# Patient Record
Sex: FEMALE | Race: WHITE
[De-identification: names, ages, dates, MRNs, and addresses within clinical notes are randomized per-mention and may not be internally consistent; named-entity substitution may affect disease eponyms.]

---

## 2017-03-07 ENCOUNTER — HOSPITAL ENCOUNTER (OUTPATIENT)
Dept: HOSPITAL 84 - D.MAMMO | Age: 57
Discharge: HOME | End: 2017-03-07
Attending: FAMILY MEDICINE
Payer: COMMERCIAL

## 2017-03-07 DIAGNOSIS — Z12.31: Primary | ICD-10-CM

## 2018-05-09 ENCOUNTER — HOSPITAL ENCOUNTER (OUTPATIENT)
Dept: HOSPITAL 84 - D.MAMMO | Age: 58
Discharge: HOME | End: 2018-05-09
Attending: FAMILY MEDICINE
Payer: COMMERCIAL

## 2018-05-09 DIAGNOSIS — Z12.31: Primary | ICD-10-CM

## 2019-08-22 ENCOUNTER — HOSPITAL ENCOUNTER (OUTPATIENT)
Dept: HOSPITAL 84 - D.MAMMO | Age: 59
Discharge: HOME | End: 2019-08-22
Attending: FAMILY MEDICINE
Payer: COMMERCIAL

## 2019-08-22 DIAGNOSIS — Z12.31: Primary | ICD-10-CM

## 2019-12-19 ENCOUNTER — HOSPITAL ENCOUNTER (OUTPATIENT)
Dept: HOSPITAL 84 - D.HCCARDIO | Age: 59
Discharge: HOME | End: 2019-12-19
Attending: INTERNAL MEDICINE
Payer: COMMERCIAL

## 2019-12-19 DIAGNOSIS — R94.31: Primary | ICD-10-CM

## 2019-12-19 DIAGNOSIS — I10: ICD-10-CM

## 2020-01-15 ENCOUNTER — HOSPITAL ENCOUNTER (OUTPATIENT)
Dept: HOSPITAL 84 - D.CATH | Age: 60
Discharge: HOME | End: 2020-01-15
Attending: INTERNAL MEDICINE
Payer: COMMERCIAL

## 2020-01-15 VITALS — BODY MASS INDEX: 44.77 KG/M2 | WEIGHT: 288.6 LBS | HEIGHT: 67.5 IN | BODY MASS INDEX: 44.77 KG/M2

## 2020-01-15 VITALS — SYSTOLIC BLOOD PRESSURE: 147 MMHG | DIASTOLIC BLOOD PRESSURE: 69 MMHG

## 2020-01-15 DIAGNOSIS — R06.00: ICD-10-CM

## 2020-01-15 DIAGNOSIS — R94.31: ICD-10-CM

## 2020-01-15 DIAGNOSIS — I20.9: Primary | ICD-10-CM

## 2020-01-15 LAB
ANION GAP SERPL CALC-SCNC: 11.3 MMOL/L (ref 8–16)
BASOPHILS NFR BLD AUTO: 0.3 % (ref 0–2)
BUN SERPL-MCNC: 14 MG/DL (ref 7–18)
CALCIUM SERPL-MCNC: 8.6 MG/DL (ref 8.5–10.1)
CHLORIDE SERPL-SCNC: 104 MMOL/L (ref 98–107)
CO2 SERPL-SCNC: 29.5 MMOL/L (ref 21–32)
CREAT SERPL-MCNC: 1 MG/DL (ref 0.6–1.3)
EOSINOPHIL NFR BLD: 4.3 % (ref 0–7)
ERYTHROCYTE [DISTWIDTH] IN BLOOD BY AUTOMATED COUNT: 15.1 % (ref 11.5–14.5)
GLUCOSE SERPL-MCNC: 162 MG/DL (ref 74–106)
HCT VFR BLD CALC: 35.7 % (ref 36–48)
HGB BLD-MCNC: 12.1 G/DL (ref 12–16)
IMM GRANULOCYTES NFR BLD: 0.3 % (ref 0–5)
LYMPHOCYTES NFR BLD AUTO: 20.9 % (ref 15–50)
MCH RBC QN AUTO: 33.2 PG (ref 26–34)
MCHC RBC AUTO-ENTMCNC: 33.9 G/DL (ref 31–37)
MCV RBC: 97.8 FL (ref 80–100)
MONOCYTES NFR BLD: 10 % (ref 2–11)
NEUTROPHILS NFR BLD AUTO: 64.2 % (ref 40–80)
OSMOLALITY SERPL CALC.SUM OF ELEC: 285 MOSM/KG (ref 275–300)
PLATELET # BLD: 236 10X3/UL (ref 130–400)
PMV BLD AUTO: 9.4 FL (ref 7.4–10.4)
POTASSIUM SERPL-SCNC: 3.8 MMOL/L (ref 3.5–5.1)
RBC # BLD AUTO: 3.65 10X6/UL (ref 4–5.4)
SODIUM SERPL-SCNC: 141 MMOL/L (ref 136–145)
WBC # BLD AUTO: 6.7 10X3/UL (ref 4.8–10.8)

## 2020-01-15 NOTE — NUR
PT RECEIVED VIA STRETCHER FROM CATH LAB FOR RECOVERY.  PT AWAKE BUT
DROWSY. PT DENIES PAIN OR DISCOMFORT.  IV PATENT INFUSING VIA L HAND
PER ORDERS.  PT PLACED ON CARDIAC MONITORS AND O2 VIA NC AT 2L.  HR
NSR RATE 64, /47, RR 11, SAT 97.  R WRIST W TR BAND AND
IMMOBILIZER, NO BLEEDING OR S/S HEMATOMA NOTED.  ARM PINK AND WARM,
CAP REFILL BRISK.  PT INSTRUCTED NOT TO USE ARM SHE VERBALIZED
UNDERSTANDING.  DR AGUILAR WAS IN ROOM BEFORE PT ARRIVAL AND SPOKE WITH
FAMILY REGARDING PROCEDURE RESULTS AND PLAN OF CARE.  CALL LIGHT IN
REACH.

## 2020-01-15 NOTE — NUR
PT DISCHARGED VIA  TO [facility] WAITING IN PRIVATE VEHICLE.  PT HAD
ALL BELONGINGS AND DISCHARGE PAPERWORK.

## 2020-01-15 NOTE — NUR
PT CONTINUES RESTING, NO CHANGE IN CONDITION.  NO BLEEDING OR S/S
HEMATOMA TO R WRIST, DRESSING CDI.  VSS.  CAP REFILL BRISK.  CALL
LIGHT IN REACH.  SANDWICH OFFERED, PT REFUSED AT THIS TIME.

## 2020-01-15 NOTE — NUR
PT RESTING COMFORTABLY W EYES CLOSED.  PT GIVEN SIPS OF DT SPRITE PER
REQUEST.  TR BAND AND IMMOBILIZER IN PLACE, NO BLEEDING OR S/S
HEMATOMA NOTED.  CAP REFILL BRISK.  VSS.  FAMILY AT BEDSIDE, CALL
LIGHT IN REACH

## 2020-01-15 NOTE — NUR
PT RESTING W/O COMPLAINTS.  5CC AIR REMOVED FROM TR BAND, NO BLEEDING
OR S/S HEMATOMA NOTED. HR 63, /61, RR 16.  O2 REMOVED, SAT 94
ON ROOM AIR.  CALL LIGHT IN REACH, NEPHEW REMAINS AT BEDSIDE.

## 2020-01-15 NOTE — NUR
3 ADD'L CC AIR REMOVED FROM TR BAND, NO BLEEDING OR S/S HEMATOMA
NOTED.  VSS.  PT DENIES PAIN OR NEEDS AT THIS TIME.  CALL LIGHT
REMAINS IN REACH

## 2020-01-15 NOTE — NUR
DISCHARGE INSTRUCTIONS REVIEWED W PT AND NEPHEW, BOTH VERBALIZED
UNDERSTANDING.  IV REMOVED W CATH INTACT, MONITORS REMOVED AND PT UP
TO DRESS FOR DISCHARGE.
1540  REMAINING AIR AND TR BAND REMOVED W/O BLEEDING OR S/S HEMATOMA
NOTED.  2X2 AND TEGADERM DRESSING APPLIED.  IMMOBILIZER REAPPLIED.

## 2020-01-15 NOTE — HEMODYNAMI
PATIENT:JOSÉ MIGUEL MIMS                            MEDICAL RECORD: [id number]
: [birth year]                                            LOCATION:D.CAT          
ACCT# [id number]                                       ADMISSION DATE: 01/15/20
 
 
 Generatedon:1/15/502602:50
Patient name: JOSÉ MIGUEL MIMS Patient #: [id number] Visit #: [id number] SSN: [id number]
[id number] :
1960 Date of study: 1/15/2020
Page: Of
Hemodynamic Procedure Report
****************************
Patient Data
Patient Demographics
Procedure consent was obtained
First Name: JOSÉ MIGUEL          Gender: Female
Last Name: PRASANNA          : 1960
Middle Initial: ADDISON      Age: 59 year(s)
Patient #: [id number]       Race: 
Visit #: [id number]
SSN: [id number]
Accession #:
[id number]
Additional ID: [id number]
Contact details
Address: [place] [address]        Phone: [phone]
State: [place]
City: [place]
Zip code: [id number]
Past Medical History
Performed procedures and imaging results
Date       Procedure      Procedure Results      Comments
2019 Stress testing Positive->Intermediate
with SPECT MPI risk
Allergies: No known allergies
Admission
Admission Data
Admission Date: 1/15/2020   Admission Time: 10:59
Arrival Date: 1/15/2020     Arrival Time: 0:00
Admit Source: Other         Insurance Payor: Private
health insurance
[facility] #: [id number]
Height (in.): 67.5          BSA: 2.38 (m2)
Height (cm.): 171.45        BMI: 44.53 (kg/m2)
Weight (lbs.): 288.59
Weight (kg.): 130.9
Lab Results
Lab Result Date: 1/15/2020  Lab Result Time: 0:00
Biochemistry
Name         Units    Result                Min      Max
BUN          mg/dl    14       --(--*-)--   7        18
Creatinine   mg/dl    1        --(--*-)--   0.6      1.3
eGFR         ml/min   60       *-(----)--   90       120
NONAFRICAN
CBC
Name         Units    Result                Min      Max
Hematocrit   %        35.7     *-(----)--   42       54
Hemoglobin   g/dl     12.1     *-(----)--   13.5     17.5
Procedure
 
Procedure Types
Cath Procedure
Diagnostic Procedure
Formerly [facility] w/Coronaries
Procedure Description
Procedure Date
Procedure Date: 1/15/2020
Procedure Start Time: 13:32
Procedure End Time: 13:49
Procedure Staff
Name                            Function
Anthony Coughlin MD                   Performing Physician
Nicole George RN                Nurse
Hoda Hamilton RT              Scrub
Jelly Sexton RT                Monitor
Indication
Abnormal nuclear perfusion test
Procedure Data
Cath Procedure
Fluoroscopy
Diagnostic fluoroscopy      Total fluoroscopy Time: 1.9
time: 1.9 min               min
Diagnostic fluoroscopy      Total fluoroscopy dose: 672
dose: 672 mGy               mGy
Contrast Material
Contrast Material Type                       Amount (ml)
Isovue 370                                   61
Entry Location
Entry     Primary  Successful  Side  Size  Upsize Upsize Entry    Closure Succes
sful  Closure
Location                             (Fr)  1 (Fr) 2 (Fr) Remarks  Device        
      Remarks
Radial                         Right 6 Fr                         Exoseal
artery                               Short
Estimated blood loss: 5 ml
Diagnostic catheters
Device Type               Used For           End Catheter
Placement
DIAGNOSTIC Hendrum 110cm 5  Procedure
Fr catheter ([id number])
Procedure Complications
No complications
Procedure Medications
Medication           Administration Route Dosage
0.9% NaCl            I.V.                 100 ml/hr
Oxygen               etCO2 Nasal cannula  2 l/min
Lidocaine 2%         added to field       20
Heparin Flush Bag    added to field       2 bags
(1000units/500ml NS)
Radial Cocktail      added to field       1 syringe
(Verapamil 2mg/Nitro
400mcg/Heparin
1500units)
Versed               I.V.                 2 mg
Fentanyl             I.V.                 50 mcg
Fentanyl             I.V.                 50 mcg
Hemodynamics
Rest
BSA: 2.38 (m2) HGB: 12.1 (g/dl) O2 Consumption: Estimated: 220.24 (ml/min) O2 Co
 
nsumption
indexed: Estimated:92.54 (ml/min/m) Heart Rate: 64 (bpm)
Pressure Samples
Time     Site     Value (mmHg) Purpose      Heart      Use
Rate(bpm)
13:35    LV       152/9,36     Snapshot     74
Gradients
Valve  Time  Site Site Mean    SEP/DFP    Peak To Heart  Use
1    2    (mmHg)  (sec/min)  Peak    Rate
(mmHg)  (bpm)
Aortic 13:36 LV   AO                              76
Snapshots
Pre Cath      Intra         NCS           Post Cath
Vital Signs
Time     Heart  Resp   SPO2 etCO2   NIBP (mmHg) Rhythm  Pain    Sedation
Rate   (ipm)  (%)  (mmHg)                      Status  Level
(bpm)
13:08:58 75     15     97   44.2    154/80(102) NSR     0 (11)  10(A)
, No
pain
13:13:30 66     17     97   45.7    142/76(90)  NSR     0 (11)  10(A)
, No
pain
13:17:58 63     18     98   20.9    131/70(90)  NSR     0 (11)  10(A)
, No
pain
13:22:25 64     12     96   52.5    127/71(86)  NSR     0 (11)  10(A)
, No
pain
13:26:49 64     13     98   52.5    127/74(100) NSR     0 (11)  10(A)
, No
pain
13:31:13 67     14     97   49.5    135/75(98)  NSR     0 (11)  10(A)
, No
pain
13:35:39 65     16     98   46.5    136/76(94)  NSR     0 (11)  10(A)
, No
pain
13:40:10 70     16     95   47.2    137/69(104) NSR     0 (11)  10(A)
, No
pain
13:44:40 67     17     94   45.7    137/70(101) NSR     0 (11)  10(A)
, No
pain
13:49:11 65     15     96   45      133/70(94)  NSR     0 (11)  10(A)
, No
pain
Medications
Time     Medication       Route   Dose    Verified Delivered Reason     Notes  E
ffectiveness
by       by
13:07:45 0.9% NaCl        I.V.    100     Anthony     Nicole     used for
ml/hr   Ced George   procedure
RN
13:07:51 Oxygen           etCO2   2 l/min Anthony     Nicole     used for
Nasal           Ced George   procedure
cannula                  RN
13:07:56 Lidocaine 2%     added   20ml    Anthony     Anthony      for local
to      vial    Ced Coughlin MD  anesthetic
field
 
13:08:01 Heparin Flush    added   2 bags  Anthony     Anthony      used for
Bag              to              Ced Coughlin MD  procedure
(1000units/500ml field
NS)
13:08:06 Radial Cocktail  added   1       Anthony     Anthony      used for
(Verapamil       to      syringe Ced Coughlin MD  procedure
2mg/Nitro        field
400mcg/Heparin
1500units)
13:31:30 Versed           I.V.    2 mg    Anthony     Nicole     for
Ced George   sedation
RN
13:31:40 Fentanyl         I.V.    50 mcg  Anthony     Nicole     for
Ced George   sedation
RN
13:37:48 Fentanyl         I.V.    50 mcg  Anthony     Nicole     for
Ced George   sedation
RN
Procedure Log
Time     Note
12:45:16 Informed consent obtained and on chart
12:45:23 Arrival Date: 1/15/2020 12:00:00 AM
12:45:24 Admit Source: Other
12:45:40 Insurance Payor : Private health insurance
12:47:12 Patient Weight : 288.59 lbs
12:47:34 Patient Height : 67.5 inches
12:53:34 Indication : Abnormal nuclear perfusion test
12:53:39 Diagnostic Cath Status : Elective
12:54:02 Procedure Status Elective Heart Cath (OP).
12:54:11 Hoda Hamilton RT(R) (CV) sent for patient. Start
room use.
12:54:13 Time tracking: Regular hours (M-F 7:00 - 5:00)
12:54:18 Plan of Care:Hemodynamics will remain stable., Cardiac
rhythm will remain stable., Comfort level will be
maintained., Respiratory function will remain
adequate., Patient/ family verbilizes understanding of
procedure., Procedure tolerated without complication.,
Recovers from procedure without complications..
12:54:51 Stress Test: yes; abnormal ANDTERIOR ADN LATERAL
SEGMENTS
12:55:04 Lab results completed and on chart.
12:56:54 Risk of Mortality: 0.1
12:56:57 Risk of blood transfusion: 0.2
12:57:01 Risk of ROMERO: .3
12:57:03 Alarms reviewed by R. N.
12:57:04 Sharps counted by scrub and verified by R.N.
12:57:15 Patient received from Pre/Post Procedure Room to CCL 1
Alert and oriented. Tansferred to table in Supine
position.
12:57:17 Warm blankets applied, and karen hugger turned on for
patient comfort.
12:57:18 Correct patient and procedure confirmed by team.
12:57:20 ECG and BP/O2 sat monitors applied to patient.
12:57:28 Signed procedure consent form obtained from patient.
12:58:03 Lab Result : Creatinine 1 mg/dl
12:58:03 Lab Result : BUN 14 mg/dl
12:58:03 Lab Result : Hemoglobin 12.1 g/dl
12:58:03 Lab Result : eGFR NONAFRICAN 60 ml/min
12:58:03 Lab Result : Hematocrit 35.7 %
12:58:21 H&P Date Dictated: 2020 Within 30 days and on
 
chart..
12:58:23 Pre-procedure instructions explained to patient.
12:58:24 Pre-op teaching completed and patient verbalized
understanding.
12:58:26 Family in waiting room.
12:58:28 Patient NPO since Midnight.
12:58:36 Patient allergic to No known allergies
13:07:32 Vital chart was started
13:07:45 0.9% NaCl 100 ml/hr I.V. was administered by Nicole George RN; used for procedure; Verbal order read back
and verified.
13:07:51 Oxygen 2 l/min etCO2 Nasal cannula was administered by
Nicole George RN; used for procedure; Verbal order
read back and verified.
13:07:56 Lidocaine 2% 20ml vial added to field was administered
by Anthony Coughlin MD; for local anesthetic; Verbal order
read back and verified.
13:08:01 Heparin Flush Bag (1000units/500ml NS) 2 bags added to
field was administered by Anthony Coughlin MD; used for
procedure; Verbal order read back and verified.
13:08:06 Radial Cocktail (Verapamil 2mg/Nitro 400mcg/Heparin
1500units) 1 syringe added to field was administered
by Anthony Coughlin MD; used for procedure; Verbal order
read back and verified.
13:16:46 Is the patient allergic to Iodine/contrast media? No.
13:16:48 Was the patient premedicated? Yes
13:16:52 Is patient on blood thinner?No
13:16:58 Patient diabetic? Yes.
13:16:59 If diabetic: On Metformin? Yes
13:17:07 If on Metformin: Last Dose? 2020
13:17:10 Patient not pregnant. Patient is over age 55.
13:17:11 ----Pre-sedation anethsthesia assessment.----
13:17:14 Previous problem with sedation/anesthesia? No ?
13:17:16 Snore? Yes
13:17:17 Sleep apnea? Yes
13:17:19 Deviated septum? No
13:17:20 Opens mouth fully? Yes
13:17:21 Sticks out tongue? Yes
13:17:23 Airway obstruction? No ?
13:17:26 Dentures? No ?
13:17:32 Pre procedure: right dorsailis pedis pulse 2+ Normal;
easily identifiable; not easily obliterated
13:17:35 Modified Lewis's test Ulnar < 7 seconds
13:17:39 Patient pain scale 0/10 ?.
13:17:53 IV patent on arrival in left antecubital with 0.9%
NaCl at [facility].
13:18:00 Right Radial & Right Groin area was prepped with
chlora-prep and draped in sterile fashion
13:18:18 Rhythm: sinus rhythm
13:18:21 Baseline sample Acquired.
13:18:22 Full Disclosure recording started
13:18:25 Use device set Radial Dx or PCI
13:18:26 ACIST Syringe (90018) opened to sterile field.
13:18:27 Medline Cath Pack ([id number]) opened to sterile field.
13:18:27 Bag Decanter (2002S) opened to sterile field.
13:18:28 ACIST Hand Control (46491) opened to sterile field.
13:18:29 ACIST Manifold (33076) opened to sterile field.
13:18:30 MBraNova Lignum Wrist Support ([id number]) opened to sterile
field.
13:18:31 NEEDLE Cook 21G 4cm Radial (R32798) opened to sterile
 
field.
13:18:32 EMERALD Guide Wire (257-104) opened to sterile field.
13:18:33 SHEATH 6FR RAIN ([id number]) opened to sterile field.
13:30:58 --------ALL STOP TIME OUT------
13:30:59 Final Timeout: patient, procedure, and site verified
with staff and physician. All members of the team are
in agreement.
13:31:01 Right Radial & Right Groin site verified by team.
13:31:05 Fire Safety Assessment: A--An alcohol-based skin
anteseptic being used preoperatively., C--Open oxygen
or nitrous oxide is being used., D--An ESU, laser, or
fiber-optic light is being used.
13:31:11 Physical assessment completed. ASA score P 2 - A
patient with mild systemic disease as per Anthony Coughlin MD.
13:31:15 2) 60-89 Mildly reduced kidney function, and other
findings (as for stage 1) point to kidney disease.
13:31:19 Maximum allowable contrast dose (3.7 X eGFR X 0.75)167
ml.
13:31:24 Sedation plan: IV Moderate Sedation Medication:Versed,
Fentanyl
13:31:27 Procedure started.
13:31:30 Versed 2 mg I.V. was administered by Nicole George RN;
for sedation; Verbal order read back and verified.
13:31:40 Fentanyl 50 mcg I.V. was administered by Nicole George
RN; for sedation; Verbal order read back and verified.
13:32:03 Local anesthetic to right radial artery with Lidocaine
2% by Anthony Coughlin MD.**INITIAL ACCESS ONLY**
13:33:32 A 6 Fr Short sheath was inserted into the Right Radial
artery
13:34:25 A DIAGNOSTIC Tiger 110cm 5 Fr catheter ([id number]) was
advanced over the wire and used for Procedure.
13:34:35 Injector settings: Ml/sec: 5, Volume: 15,
13:35:28 LV gram done using RICARDO
13:35:30 LV hemodynamics recorded.
13:35:41 EF : 55 %
13:36:27 LCA angiography performed.
13:37:48 Fentanyl 50 mcg I.V. was administered by Nicole George
RN; for sedation; Verbal order read back and verified.
13:38:24 RCA angiography performed.
13:38:50 Catheter removed.
13:39:33 TR BAND Large ([id number]) opened to sterile field.
13:39:44 Sheath removed intact; hemostasis achieved with
Exoseal to the Right Radial artery.
13:39:47 Procedure ended.(Physican Out)
13:39:59 Fluoroscopy time 01.90 minutes.
13:40:03 Fluoroscopy dose: 672 mGy
13:40:03 Flurop Dose total: 672
13:40:09 Dose Area Product 91338 mGy/cm.
13:40:15 Contrast amount:Isovue 370 61ml.
13:40:20 Maximum allowable dose exceeded? No.
13:40:21 Sharps counted by scrub and verified by R.N.
13:48:05 Franklin band inflated with 10cc of air.
13:48:09 Post Procedure Pulses reassessed and unchanged
13:48:12 Post procedure: right dorsailis pedis pulse 2+ Normal;
easily identifiable; not easily obliterated.
13:48:15 Post-procedure physical assessment completed. ASA
score P 2 - A patient with mild systemic disease as
per Anthony Coughlin MD.
13:48:18 Post procedure rhythm: unchanged.
 
13:48:21 Estimated blood loss: 5 ml
13:48:22 Post procedure instruction explained to
patient.Patient verbalizes understanding.
13:48:23 Patient needs reinforcement of post procedure
teaching.
13:49:10 Procedure and supply charges have been captured,
reviewed, submitted and are correct.
13:49:14 Procedure Complication : No complications
13:49:18 Vital chart was stopped
13:49:20 [facility] Findings: mild to moderate CAD (<70%)
13:49:21 Operative report dictated upon procedure completion.
13:49:21 See physician's report for complete and final results.
13:49:23 Report given to Pre/Post Procedure Room.
13:49:26 Patient transfered to Pre/Post Procedure Room with
Stretcher.
13:49:28 Procedure ended.
13:49:28 Full Disclosure recording stopped
13:49:40 End room use (Document Last)
13:49:59 End room use (Document Last)
13:50:20 End room use (Document Last)
Device Usage
Item Name   Manufacture  Quantity  Catalog      Hospital Part     Current Minima
l Lot# /
Number       Charge   Number   Stock   Stock   Serial#
Code
ACIST       Acist        1         65220        901160   116121   957323  20
Syringe     Medical
(38507)     Systems Inc
Medline     Medline      1         [id number]    549218   02562    340606  5
Cath Pack
([id number])
Bag         Microtek     1                 996379   24688    823023  5
Decanter    Medical Inc.
()
ACIST Hand  Acist        1         17850        644361   627665   451067  5
Control     Medical
(93184)     Systems Inc
ACIST       Acist        1         24523        831305   029422   241554  5
Manifold    Medical
(01820)     Systems Inc
MBrace      Advanced     1         [id number]  055187   84892    365127  5
Wrist       Vascular
Support     Dynamics
([id number])
NEEDLE Cook [employer] 1         [id number]       583765   649011   653132  5
21G 4cm
Radial
(V05058)
EMERALD     Cardinal     1         807-956      126523   461084   507083  5
Guide Wire  [facility]
(198-754)
SHEATH 6FR  Cardinal     1         1515244      682416   3558230  010063  5
[facility]
(8201358)
DIAGNOSTIC  Terumo       1               374773   990683   043432  5
Tiger 110cm
5 Fr
catheter
(392954)
TR BAND     Terumo       1         [id number]    570165   794075   799598  40
 
Large
([id number])
Signature Audit Riparius
Stage           Time        Signature      Unsigned
Intra-Procedure 1/15/2020   Jelly Sexton
1:49:59 PM  RT(R)
Intra-Procedure 1/15/2020   Nicole George
1:50:20 PM  RN
Intra-Procedure 1/15/2020   Anthony Coughlin MD
1:50:40 PM
 
 
 
 
 
 
 
 
 
 
 
 
 
 
 
 
 
 
 
 
 
 
 
 
 
 
 
 
 
 
 
 
 
 
 
 
 
 
 
 
 
 
 
 
 
[facility]                                          
[address] [id number]

## 2021-06-10 ENCOUNTER — HOSPITAL ENCOUNTER (OUTPATIENT)
Dept: HOSPITAL 84 - D.MAMMO | Age: 61
Discharge: HOME | End: 2021-06-10
Attending: FAMILY MEDICINE
Payer: COMMERCIAL

## 2021-06-10 VITALS — BODY MASS INDEX: 44.5 KG/M2

## 2021-06-10 DIAGNOSIS — Z12.31: Primary | ICD-10-CM
